# Patient Record
Sex: FEMALE | Race: WHITE | NOT HISPANIC OR LATINO | URBAN - METROPOLITAN AREA
[De-identification: names, ages, dates, MRNs, and addresses within clinical notes are randomized per-mention and may not be internally consistent; named-entity substitution may affect disease eponyms.]

---
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
## 2021-07-17 ENCOUNTER — EMERGENCY (EMERGENCY)
Facility: HOSPITAL | Age: 21
LOS: 0 days | Discharge: HOME | End: 2021-07-17
Attending: EMERGENCY MEDICINE
Payer: COMMERCIAL

## 2021-07-17 VITALS — WEIGHT: 139.99 LBS | HEIGHT: 67 IN

## 2021-07-17 VITALS
OXYGEN SATURATION: 100 % | DIASTOLIC BLOOD PRESSURE: 56 MMHG | HEART RATE: 78 BPM | TEMPERATURE: 98 F | SYSTOLIC BLOOD PRESSURE: 115 MMHG | RESPIRATION RATE: 19 BRPM

## 2021-07-17 DIAGNOSIS — R10.31 RIGHT LOWER QUADRANT PAIN: ICD-10-CM

## 2021-07-17 DIAGNOSIS — R19.7 DIARRHEA, UNSPECIFIED: ICD-10-CM

## 2021-07-17 DIAGNOSIS — R11.2 NAUSEA WITH VOMITING, UNSPECIFIED: ICD-10-CM

## 2021-07-17 LAB
ALBUMIN SERPL ELPH-MCNC: 4.7 G/DL — SIGNIFICANT CHANGE UP (ref 3.3–5)
ALP SERPL-CCNC: 63 U/L — SIGNIFICANT CHANGE UP (ref 40–120)
ALT FLD-CCNC: 25 U/L — SIGNIFICANT CHANGE UP (ref 12–78)
ANION GAP SERPL CALC-SCNC: 8 MMOL/L — SIGNIFICANT CHANGE UP (ref 5–17)
APPEARANCE UR: CLEAR — SIGNIFICANT CHANGE UP
AST SERPL-CCNC: 23 U/L — SIGNIFICANT CHANGE UP (ref 15–37)
BASOPHILS # BLD AUTO: 0.04 K/UL — SIGNIFICANT CHANGE UP (ref 0–0.2)
BASOPHILS NFR BLD AUTO: 0.3 % — SIGNIFICANT CHANGE UP (ref 0–2)
BILIRUB SERPL-MCNC: 0.9 MG/DL — SIGNIFICANT CHANGE UP (ref 0.2–1.2)
BILIRUB UR-MCNC: NEGATIVE — SIGNIFICANT CHANGE UP
BUN SERPL-MCNC: 16 MG/DL — SIGNIFICANT CHANGE UP (ref 7–23)
CALCIUM SERPL-MCNC: 9.8 MG/DL — SIGNIFICANT CHANGE UP (ref 8.5–10.1)
CHLORIDE SERPL-SCNC: 103 MMOL/L — SIGNIFICANT CHANGE UP (ref 96–108)
CO2 SERPL-SCNC: 25 MMOL/L — SIGNIFICANT CHANGE UP (ref 22–31)
COLOR SPEC: YELLOW — SIGNIFICANT CHANGE UP
CREAT SERPL-MCNC: 0.73 MG/DL — SIGNIFICANT CHANGE UP (ref 0.5–1.3)
DIFF PNL FLD: NEGATIVE — SIGNIFICANT CHANGE UP
EOSINOPHIL # BLD AUTO: 0 K/UL — SIGNIFICANT CHANGE UP (ref 0–0.5)
EOSINOPHIL NFR BLD AUTO: 0 % — SIGNIFICANT CHANGE UP (ref 0–6)
GLUCOSE SERPL-MCNC: 74 MG/DL — SIGNIFICANT CHANGE UP (ref 70–99)
GLUCOSE UR QL: NEGATIVE MG/DL — SIGNIFICANT CHANGE UP
HCT VFR BLD CALC: 40 % — SIGNIFICANT CHANGE UP (ref 34.5–45)
HGB BLD-MCNC: 13.1 G/DL — SIGNIFICANT CHANGE UP (ref 11.5–15.5)
IMM GRANULOCYTES NFR BLD AUTO: 0.3 % — SIGNIFICANT CHANGE UP (ref 0–1.5)
KETONES UR-MCNC: ABNORMAL
LEUKOCYTE ESTERASE UR-ACNC: NEGATIVE — SIGNIFICANT CHANGE UP
LIDOCAIN IGE QN: 58 U/L — LOW (ref 73–393)
LYMPHOCYTES # BLD AUTO: 1.99 K/UL — SIGNIFICANT CHANGE UP (ref 1–3.3)
LYMPHOCYTES # BLD AUTO: 12.7 % — LOW (ref 13–44)
MCHC RBC-ENTMCNC: 30.3 PG — SIGNIFICANT CHANGE UP (ref 27–34)
MCHC RBC-ENTMCNC: 32.8 GM/DL — SIGNIFICANT CHANGE UP (ref 32–36)
MCV RBC AUTO: 92.4 FL — SIGNIFICANT CHANGE UP (ref 80–100)
MONOCYTES # BLD AUTO: 0.45 K/UL — SIGNIFICANT CHANGE UP (ref 0–0.9)
MONOCYTES NFR BLD AUTO: 2.9 % — SIGNIFICANT CHANGE UP (ref 2–14)
NEUTROPHILS # BLD AUTO: 13.19 K/UL — HIGH (ref 1.8–7.4)
NEUTROPHILS NFR BLD AUTO: 83.8 % — HIGH (ref 43–77)
NITRITE UR-MCNC: NEGATIVE — SIGNIFICANT CHANGE UP
PH UR: 6 — SIGNIFICANT CHANGE UP (ref 5–8)
PLATELET # BLD AUTO: 267 K/UL — SIGNIFICANT CHANGE UP (ref 150–400)
POTASSIUM SERPL-MCNC: 4.4 MMOL/L — SIGNIFICANT CHANGE UP (ref 3.5–5.3)
POTASSIUM SERPL-SCNC: 4.4 MMOL/L — SIGNIFICANT CHANGE UP (ref 3.5–5.3)
PROT SERPL-MCNC: 7.8 GM/DL — SIGNIFICANT CHANGE UP (ref 6–8.3)
PROT UR-MCNC: 15 MG/DL
RBC # BLD: 4.33 M/UL — SIGNIFICANT CHANGE UP (ref 3.8–5.2)
RBC # FLD: 11.9 % — SIGNIFICANT CHANGE UP (ref 10.3–14.5)
SODIUM SERPL-SCNC: 136 MMOL/L — SIGNIFICANT CHANGE UP (ref 135–145)
SP GR SPEC: 1.02 — SIGNIFICANT CHANGE UP (ref 1.01–1.02)
UROBILINOGEN FLD QL: NEGATIVE MG/DL — SIGNIFICANT CHANGE UP
WBC # BLD: 15.72 K/UL — HIGH (ref 3.8–10.5)
WBC # FLD AUTO: 15.72 K/UL — HIGH (ref 3.8–10.5)

## 2021-07-17 PROCEDURE — 76856 US EXAM PELVIC COMPLETE: CPT | Mod: 26

## 2021-07-17 PROCEDURE — 83690 ASSAY OF LIPASE: CPT

## 2021-07-17 PROCEDURE — 76705 ECHO EXAM OF ABDOMEN: CPT

## 2021-07-17 PROCEDURE — 76856 US EXAM PELVIC COMPLETE: CPT

## 2021-07-17 PROCEDURE — 81001 URINALYSIS AUTO W/SCOPE: CPT

## 2021-07-17 PROCEDURE — 85025 COMPLETE CBC W/AUTO DIFF WBC: CPT

## 2021-07-17 PROCEDURE — 76705 ECHO EXAM OF ABDOMEN: CPT | Mod: 26

## 2021-07-17 PROCEDURE — 36415 COLL VENOUS BLD VENIPUNCTURE: CPT

## 2021-07-17 PROCEDURE — 80053 COMPREHEN METABOLIC PANEL: CPT

## 2021-07-17 PROCEDURE — 99285 EMERGENCY DEPT VISIT HI MDM: CPT

## 2021-07-17 PROCEDURE — 87086 URINE CULTURE/COLONY COUNT: CPT

## 2021-07-17 PROCEDURE — 99284 EMERGENCY DEPT VISIT MOD MDM: CPT | Mod: 25

## 2021-07-17 RX ORDER — ONDANSETRON 8 MG/1
1 TABLET, FILM COATED ORAL
Qty: 20 | Refills: 0
Start: 2021-07-17

## 2021-07-17 RX ORDER — ACETAMINOPHEN 500 MG
1000 TABLET ORAL ONCE
Refills: 0 | Status: COMPLETED | OUTPATIENT
Start: 2021-07-17 | End: 2021-07-17

## 2021-07-17 RX ORDER — ONDANSETRON 8 MG/1
4 TABLET, FILM COATED ORAL ONCE
Refills: 0 | Status: COMPLETED | OUTPATIENT
Start: 2021-07-17 | End: 2021-07-17

## 2021-07-17 RX ORDER — SODIUM CHLORIDE 9 MG/ML
1000 INJECTION INTRAMUSCULAR; INTRAVENOUS; SUBCUTANEOUS ONCE
Refills: 0 | Status: COMPLETED | OUTPATIENT
Start: 2021-07-17 | End: 2021-07-17

## 2021-07-17 RX ADMIN — Medication 1000 MILLIGRAM(S): at 15:24

## 2021-07-17 RX ADMIN — ONDANSETRON 4 MILLIGRAM(S): 8 TABLET, FILM COATED ORAL at 14:07

## 2021-07-17 RX ADMIN — SODIUM CHLORIDE 1000 MILLILITER(S): 9 INJECTION INTRAMUSCULAR; INTRAVENOUS; SUBCUTANEOUS at 14:06

## 2021-07-17 NOTE — ED STATDOCS - NSFOLLOWUPINSTRUCTIONS_ED_ALL_ED_FT

## 2021-07-17 NOTE — ED STATDOCS - SCRIBE NAME
Amarilis Kaiser
I personally performed the service described in the documentation recorded by the scribe in my presence, and it accurately and completely records my words and actions.

## 2021-07-17 NOTE — ED STATDOCS - CONSTITUTIONAL, MLM
well appearing and in no apparent distress. normal... well appearing and in no apparent distress. nontoxic appearing. NAD.

## 2021-07-17 NOTE — ED STATDOCS - CARE PLAN
Principal Discharge DX:	Non-intractable vomiting with nausea  Secondary Diagnosis:	Right lower quadrant abdominal pain

## 2021-07-17 NOTE — ED ADULT NURSE NOTE - OBJECTIVE STATEMENT
Pt arrives to ED accompanied by parents complaining of abdominal pain with nausea and vomiting. denies medical history. alert and oriented x4. ambulatory.

## 2021-07-17 NOTE — ED STATDOCS - PATIENT PORTAL LINK FT
You can access the FollowMyHealth Patient Portal offered by [facility] by registering at the following website: [email or web address]. By joining [employer]’s FollowMyHealth portal, you will also be able to view your health information using other applications (apps) compatible with our system.

## 2021-07-17 NOTE — ED STATDOCS - PROGRESS NOTE DETAILS
19 y/o F with PMH of breast reduction apx 1 year ago presents with sudden onset nausea/vomiting/diarrhea which started at 6:30AM today. Mother at bedside reports patient has been involved in intense tennis training this week, being outside in the heat for prolonged amounts of time. Also had seafood last night for dinner. Denies fever, chills, abdominal pain, urinary complaints, vaginal bleeding. Reports stool as brown in color. PE: Well appearing. Cardiac: s1s2, RRR. Lungs: CTAB. Abdomen: NBS x4, soft, nontender. No CVAT. A/P: N/V/D. Will provide IVF, antiemetics, check labs. Abdomen nontender, will hold imaging at this time. - Corbin Canela PA-C Jaime ALTAMIARNO for Dr. Díaz: updated pt with results of labs and imaging +leukocytosis 15K. Will send for US r/o appendicitis. Will also evaluate for ovarian cyst with US pelvis. Reassess. - Corbin Canela PA-C Arjun HOLT: Spoke with patient and updated her on the results of the available labs and imaging. Spoke with patient's father and mother with patient's consent and permission. Patient with slight leukocytosis. Spoke with patient's physician Dr. Triplett, on the phone via dad who agrees that patient is borderline as far as risk for appendicitis. Patient is currently tender on palpation of RLQ but has no rebound tenderness. Spoke with surgical attending Dr. Vazquez who will contact Dr. Mandeep Lal to come and see the patient. Dr. Triplett also on his way to see patient. Offered CT vs. serial exam and outpatient follow up for patient. Patient currently awaiting re-evaluation. 21 y/o F with PMH of breast reduction apx 1 year ago presents with sudden onset nausea/vomiting/diarrhea which started at 6:30AM today. Mother at bedside reports patient has been involved in intense tennis training this week, being outside in the heat for prolonged amounts of time. Also had seafood last night for dinner. Denies fever, chills, urinary complaints, vaginal bleeding. Reports stool as brown in color. PE: Well appearing. Cardiac: s1s2, RRR. Lungs: CTAB. Abdomen: NBS x4, soft, nontender. No CVAT. A/P: N/V/D. Will provide IVF, antiemetics, check labs. Abdomen nontender, will hold imaging at this time. - Corbin Canela PA-C Shared decision making between patient, family, Dr. Nance and Dr. Lal. Will hold further imaging at this time. Will provide zofran for home use. Return precautions provided. UA pending, will call with results upon completion. - Corbin Canela PA-C Arjun HOLT: Spoke with patient and updated her on the results of the available labs and imaging. Spoke with patient's father and mother with patient's consent and permission. Patient with slight leukocytosis. Spoke with patient's physician Dr. Triplett, on the phone via dad who agrees that patient is borderline as far as risk for appendicitis. Patient is currently tender on palpation of RLQ but has no rebound tenderness. Spoke with surgical attending Dr. Vazquez who will contact Dr. Mandeep Lal to come and see the patient. Dr. washington also on his way to see patient. Offered CT vs. serial exam and outpatient follow up for patient. Patient currently awaiting re-evaluation. Jaime Díaz: updated pt's mom and dad (with patient's permission and consent) with results of labs and imaging available.

## 2021-07-17 NOTE — ED STATDOCS - ATTENDING CONTRIBUTION TO CARE
I Ramakrishna Díaz MD saw and examined the patient. MLP saw and examined the patient under my supervision. I discussed the care of the patient with MLP and agree with MLP's plan, assessment and care of the patient while in the ED.

## 2021-07-17 NOTE — ED STATDOCS - OBJECTIVE STATEMENT
21 y/o female with a PMHx of Vasovagal syncope, h/o breast reduction 1 year ago by Dr. Marie in [facility], presents to the ED c/o lower abdominal pain (mostly in the R), nausea, vomiting since 0600 today. States vomiting resolved at 11am. States nausea and abd pain continued and had diarrhea. +Decrease po intake. No blood in stool, fever, chills, CP, SOB. Mother states pt has been training tennis intensely this week in the heat for a prolonged period of time. 21 y/o female with a PMHx of Vasovagal syncope, h/o breast reduction 1 year ago by Dr. Marie in [facility], presents to the ED c/o lower abdominal pain (mostly in the R), nausea, vomiting since 0600 today. States vomiting resolved at 11am. States nausea and abd pain continued and had diarrhea. +Decrease po intake. No blood in stool, fever, chills, CP, SOB. Mother states pt has been training tennis intensely this week in the heat for a prolonged period of time. no neck pain or stiffness. No other complaints.

## 2021-07-17 NOTE — ED STATDOCS - NS_ ATTENDINGSCRIBEDETAILS _ED_A_ED_FT
I Ramakrishna Díaz MD saw and examined the patient. Scribe documented for me and under my supervision. I have modified the scribe's documentation where necessary to reflect my history, physical exam and other relevant documentations pertinent to the care of the patient.

## 2021-07-17 NOTE — ED STATDOCS - NEUROLOGICAL, MLM
sensation is normal and strength is normal. NIH 0. GCS 15 sensation is normal and strength is normal. Peds GCS 15

## 2021-07-17 NOTE — ED STATDOCS - CROS ED ROS STATEMENT
See the other encounter.    Clem, RN  Triage Nurse           all other ROS negative except as per HPI

## 2021-07-17 NOTE — ED STATDOCS - EYES, MLM
clear bilaterally.  Pupils equal, round, and reactive to light. clear bilaterally.  Pupils equal, round, and reactive to accomodation. EOMI.

## 2021-07-18 LAB
CULTURE RESULTS: SIGNIFICANT CHANGE UP
SPECIMEN SOURCE: SIGNIFICANT CHANGE UP

## 2022-08-15 ENCOUNTER — INPATIENT (INPATIENT)
Facility: HOSPITAL | Age: 22
LOS: 0 days | Discharge: HOME | DRG: 343 | End: 2022-08-16
Attending: SURGERY | Admitting: SURGERY
Payer: COMMERCIAL

## 2022-08-15 VITALS — WEIGHT: 139.99 LBS | HEIGHT: 67 IN

## 2022-08-15 LAB
APPEARANCE UR: CLEAR — SIGNIFICANT CHANGE UP
BILIRUB UR-MCNC: NEGATIVE — SIGNIFICANT CHANGE UP
COLOR SPEC: YELLOW — SIGNIFICANT CHANGE UP
DIFF PNL FLD: ABNORMAL
GLUCOSE UR QL: NEGATIVE — SIGNIFICANT CHANGE UP
HCT VFR BLD CALC: 37.3 % — SIGNIFICANT CHANGE UP (ref 34.5–45)
HGB BLD-MCNC: 12.7 G/DL — SIGNIFICANT CHANGE UP (ref 11.5–15.5)
KETONES UR-MCNC: NEGATIVE — SIGNIFICANT CHANGE UP
LEUKOCYTE ESTERASE UR-ACNC: NEGATIVE — SIGNIFICANT CHANGE UP
MCHC RBC-ENTMCNC: 31.2 PG — SIGNIFICANT CHANGE UP (ref 27–34)
MCHC RBC-ENTMCNC: 34 GM/DL — SIGNIFICANT CHANGE UP (ref 32–36)
MCV RBC AUTO: 91.6 FL — SIGNIFICANT CHANGE UP (ref 80–100)
NITRITE UR-MCNC: NEGATIVE — SIGNIFICANT CHANGE UP
PH UR: 6 — SIGNIFICANT CHANGE UP (ref 5–8)
PLATELET # BLD AUTO: 215 K/UL — SIGNIFICANT CHANGE UP (ref 150–400)
PROT UR-MCNC: NEGATIVE — SIGNIFICANT CHANGE UP
RBC # BLD: 4.07 M/UL — SIGNIFICANT CHANGE UP (ref 3.8–5.2)
RBC # FLD: 11.9 % — SIGNIFICANT CHANGE UP (ref 10.3–14.5)
SP GR SPEC: 1 — LOW (ref 1.01–1.02)
UROBILINOGEN FLD QL: NEGATIVE — SIGNIFICANT CHANGE UP
WBC # BLD: 6.76 K/UL — SIGNIFICANT CHANGE UP (ref 3.8–10.5)
WBC # FLD AUTO: 6.76 K/UL — SIGNIFICANT CHANGE UP (ref 3.8–10.5)

## 2022-08-15 PROCEDURE — 76856 US EXAM PELVIC COMPLETE: CPT | Mod: 26,59

## 2022-08-15 PROCEDURE — 76830 TRANSVAGINAL US NON-OB: CPT | Mod: 26

## 2022-08-15 PROCEDURE — 76705 ECHO EXAM OF ABDOMEN: CPT | Mod: 26

## 2022-08-15 PROCEDURE — C9399: CPT

## 2022-08-15 PROCEDURE — 99285 EMERGENCY DEPT VISIT HI MDM: CPT

## 2022-08-15 PROCEDURE — 88304 TISSUE EXAM BY PATHOLOGIST: CPT

## 2022-08-15 PROCEDURE — C1889: CPT

## 2022-08-15 RX ORDER — MINERAL OIL
133 OIL (ML) MISCELLANEOUS ONCE
Refills: 0 | Status: COMPLETED | OUTPATIENT
Start: 2022-08-15 | End: 2022-08-15

## 2022-08-15 RX ORDER — DIAZEPAM 5 MG
5 TABLET ORAL ONCE
Refills: 0 | Status: DISCONTINUED | OUTPATIENT
Start: 2022-08-15 | End: 2022-08-15

## 2022-08-15 RX ORDER — LIDOCAINE AND PRILOCAINE CREAM 25; 25 MG/G; MG/G
1 CREAM TOPICAL ONCE
Refills: 0 | Status: COMPLETED | OUTPATIENT
Start: 2022-08-15 | End: 2022-08-15

## 2022-08-15 RX ORDER — ACETAMINOPHEN 500 MG
650 TABLET ORAL ONCE
Refills: 0 | Status: COMPLETED | OUTPATIENT
Start: 2022-08-15 | End: 2022-08-15

## 2022-08-15 RX ADMIN — LIDOCAINE AND PRILOCAINE CREAM 1 APPLICATION(S): 25; 25 CREAM TOPICAL at 22:15

## 2022-08-15 RX ADMIN — Medication 5 MILLIGRAM(S): at 22:13

## 2022-08-15 RX ADMIN — Medication 133 MILLILITER(S): at 21:08

## 2022-08-15 NOTE — ED STATDOCS - CLINICAL SUMMARY MEDICAL DECISION MAKING FREE TEXT BOX
Pt with constipation and RLQ abd pain. Low suspicion of appendicitis however will sono to r/o appendicitis. Will sono ovaries to r/o ovarian pathology, rectal exam as pt was sent in for possible disimpaction. 66.8

## 2022-08-15 NOTE — ED STATDOCS - OBJECTIVE STATEMENT
20 y/o female with a PMHx of constipation presents to the ED c/o constipation. Pt took Miralax, Dulcolax and Ex lax at home without relief. Mother reports pt has hx of anal fissure. +nausea. Denies vomiting, fevers. No other complaints at this time.

## 2022-08-15 NOTE — ED STATDOCS - PROGRESS NOTE DETAILS
Des Murphy MD wanted labs for concern for appendicitis however pt states she thinks this is constipation and does not want labs at this time, understands the risk of not checking labs. also does not want CT for r/o appy, john diaz. received so from Dr. Das, labs and ct r/o appendicitis pending KRISTI Canas DO pt sleeping, updated family at bedside, awaiting ct results KRISTI Canas DO spoke with parents at bedside, called on call Dr. Lal, will give zosyn pre op. pt sleeping at this time KRISTI Canas DO

## 2022-08-15 NOTE — ED ADULT NURSE NOTE - OBJECTIVE STATEMENT
21 year old female found laying on stretcher complaining of abdominal pain with constipation x1 week.  pt has taken several OTC medications for constipation and they only caused severe cramping.

## 2022-08-15 NOTE — ED ADULT TRIAGE NOTE - CHIEF COMPLAINT QUOTE
Pt c/o constipation x1 week. Has taken Mirlax, Exlax, and Ducolax with no relief. Only passing gas at night. Last BM on 8/8. Denies abd. pain, N/V, fever, and urinary symptoms.

## 2022-08-16 VITALS
HEART RATE: 52 BPM | DIASTOLIC BLOOD PRESSURE: 76 MMHG | TEMPERATURE: 98 F | RESPIRATION RATE: 16 BRPM | OXYGEN SATURATION: 100 % | SYSTOLIC BLOOD PRESSURE: 112 MMHG

## 2022-08-16 DIAGNOSIS — K35.80 UNSPECIFIED ACUTE APPENDICITIS: ICD-10-CM

## 2022-08-16 DIAGNOSIS — Z98.890 OTHER SPECIFIED POSTPROCEDURAL STATES: Chronic | ICD-10-CM

## 2022-08-16 LAB
ALBUMIN SERPL ELPH-MCNC: 4.1 G/DL — SIGNIFICANT CHANGE UP (ref 3.3–5)
ALP SERPL-CCNC: 46 U/L — SIGNIFICANT CHANGE UP (ref 40–120)
ALT FLD-CCNC: 29 U/L — SIGNIFICANT CHANGE UP (ref 12–78)
ANION GAP SERPL CALC-SCNC: 5 MMOL/L — SIGNIFICANT CHANGE UP (ref 5–17)
APTT BLD: 34.8 SEC — SIGNIFICANT CHANGE UP (ref 27.5–35.5)
AST SERPL-CCNC: 27 U/L — SIGNIFICANT CHANGE UP (ref 15–37)
BILIRUB SERPL-MCNC: 0.7 MG/DL — SIGNIFICANT CHANGE UP (ref 0.2–1.2)
BUN SERPL-MCNC: 7 MG/DL — SIGNIFICANT CHANGE UP (ref 7–23)
CALCIUM SERPL-MCNC: 9.2 MG/DL — SIGNIFICANT CHANGE UP (ref 8.5–10.1)
CHLORIDE SERPL-SCNC: 105 MMOL/L — SIGNIFICANT CHANGE UP (ref 96–108)
CO2 SERPL-SCNC: 25 MMOL/L — SIGNIFICANT CHANGE UP (ref 22–31)
CREAT SERPL-MCNC: 0.72 MG/DL — SIGNIFICANT CHANGE UP (ref 0.5–1.3)
CULTURE RESULTS: SIGNIFICANT CHANGE UP
EGFR: 123 ML/MIN/1.73M2 — SIGNIFICANT CHANGE UP
GLUCOSE SERPL-MCNC: 75 MG/DL — SIGNIFICANT CHANGE UP (ref 70–99)
HCG SERPL-ACNC: <1 MIU/ML — SIGNIFICANT CHANGE UP
INR BLD: 1.08 RATIO — SIGNIFICANT CHANGE UP (ref 0.88–1.16)
POTASSIUM SERPL-MCNC: 3.6 MMOL/L — SIGNIFICANT CHANGE UP (ref 3.5–5.3)
POTASSIUM SERPL-SCNC: 3.6 MMOL/L — SIGNIFICANT CHANGE UP (ref 3.5–5.3)
PROT SERPL-MCNC: 7.6 GM/DL — SIGNIFICANT CHANGE UP (ref 6–8.3)
PROTHROM AB SERPL-ACNC: 12.5 SEC — SIGNIFICANT CHANGE UP (ref 10.5–13.4)
SARS-COV-2 RNA SPEC QL NAA+PROBE: SIGNIFICANT CHANGE UP
SODIUM SERPL-SCNC: 135 MMOL/L — SIGNIFICANT CHANGE UP (ref 135–145)
SPECIMEN SOURCE: SIGNIFICANT CHANGE UP

## 2022-08-16 PROCEDURE — 88304 TISSUE EXAM BY PATHOLOGIST: CPT | Mod: 26

## 2022-08-16 PROCEDURE — 74177 CT ABD & PELVIS W/CONTRAST: CPT | Mod: 26,MD

## 2022-08-16 PROCEDURE — 99222 1ST HOSP IP/OBS MODERATE 55: CPT

## 2022-08-16 PROCEDURE — ZZZZZ: CPT

## 2022-08-16 RX ORDER — ALPRAZOLAM 0.25 MG
1 TABLET ORAL
Qty: 0 | Refills: 0 | DISCHARGE

## 2022-08-16 RX ORDER — SODIUM CHLORIDE 9 MG/ML
1000 INJECTION INTRAMUSCULAR; INTRAVENOUS; SUBCUTANEOUS
Refills: 0 | Status: DISCONTINUED | OUTPATIENT
Start: 2022-08-16 | End: 2022-08-16

## 2022-08-16 RX ORDER — DEXTROAMPHETAMINE SACCHARATE, AMPHETAMINE ASPARTATE, DEXTROAMPHETAMINE SULFATE AND AMPHETAMINE SULFATE 1.875; 1.875; 1.875; 1.875 MG/1; MG/1; MG/1; MG/1
1 TABLET ORAL
Qty: 0 | Refills: 0 | DISCHARGE

## 2022-08-16 RX ORDER — FENTANYL CITRATE 50 UG/ML
25 INJECTION INTRAVENOUS
Refills: 0 | Status: DISCONTINUED | OUTPATIENT
Start: 2022-08-16 | End: 2022-08-16

## 2022-08-16 RX ORDER — HYDROMORPHONE HYDROCHLORIDE 2 MG/ML
0.5 INJECTION INTRAMUSCULAR; INTRAVENOUS; SUBCUTANEOUS
Refills: 0 | Status: DISCONTINUED | OUTPATIENT
Start: 2022-08-16 | End: 2022-08-16

## 2022-08-16 RX ORDER — SERTRALINE 25 MG/1
0 TABLET, FILM COATED ORAL
Qty: 0 | Refills: 0 | DISCHARGE

## 2022-08-16 RX ORDER — ONDANSETRON 8 MG/1
4 TABLET, FILM COATED ORAL EVERY 6 HOURS
Refills: 0 | Status: DISCONTINUED | OUTPATIENT
Start: 2022-08-16 | End: 2022-08-16

## 2022-08-16 RX ORDER — PIPERACILLIN AND TAZOBACTAM 4; .5 G/20ML; G/20ML
3.38 INJECTION, POWDER, LYOPHILIZED, FOR SOLUTION INTRAVENOUS ONCE
Refills: 0 | Status: COMPLETED | OUTPATIENT
Start: 2022-08-16 | End: 2022-08-16

## 2022-08-16 RX ORDER — ONDANSETRON 8 MG/1
4 TABLET, FILM COATED ORAL ONCE
Refills: 0 | Status: DISCONTINUED | OUTPATIENT
Start: 2022-08-16 | End: 2022-08-16

## 2022-08-16 RX ORDER — SODIUM CHLORIDE 9 MG/ML
1000 INJECTION, SOLUTION INTRAVENOUS
Refills: 0 | Status: DISCONTINUED | OUTPATIENT
Start: 2022-08-16 | End: 2022-08-16

## 2022-08-16 RX ADMIN — SODIUM CHLORIDE 125 MILLILITER(S): 9 INJECTION INTRAMUSCULAR; INTRAVENOUS; SUBCUTANEOUS at 04:58

## 2022-08-16 RX ADMIN — Medication 1 MILLIGRAM(S): at 00:02

## 2022-08-16 RX ADMIN — PIPERACILLIN AND TAZOBACTAM 200 GRAM(S): 4; .5 INJECTION, POWDER, LYOPHILIZED, FOR SOLUTION INTRAVENOUS at 03:50

## 2022-08-16 NOTE — ASU DISCHARGE PLAN (ADULT/PEDIATRIC) - CALL YOUR DOCTOR IF YOU HAVE ANY OF THE FOLLOWING:
Pain not relieved by Medications/Fever greater than (need to indicate Fahrenheit or Celsius)/Unable to urinate/Inability to tolerate liquids or foods

## 2022-08-16 NOTE — H&P ADULT - ASSESSMENT
Ass:  Acute Appendicitis  Plan:  As discussed with Dr. Lal, NPO, IVF, Covid swab pending, Emergent Laparoscopic Appendectomy

## 2022-08-16 NOTE — ASU DISCHARGE PLAN (ADULT/PEDIATRIC) - NS MD DC FALL RISK RISK
For information on Fall & Injury Prevention, visit: [email or web address] [place]  [email or web address] [place]  https://www.cdc.gov/steadi/patient.html

## 2022-08-16 NOTE — ED ADULT NURSE REASSESSMENT NOTE - NS ED NURSE REASSESS COMMENT FT1
Pt found brought in from [facility], pt found resting peacefully in stretcher in lowest position, VSS, pt parents at bedside. Pt pending sx. Medications administered as ordered. COVID swab sent to lab, requested RAPID test for COVID.

## 2022-08-16 NOTE — H&P ADULT - NSHPPHYSICALEXAM_GEN_ALL_CORE
T(C): 36.7 (08-16-22 @ 00:10), Max: 36.7 (08-15-22 @ 18:12)  HR: 55 (08-16-22 @ 00:10) (55 - 56)  BP: 116/85 (08-16-22 @ 00:10) (106/86 - 116/85)  RR: 18 (08-16-22 @ 00:10) (18 - 18)  SpO2: 100% (08-16-22 @ 00:10) (100% - 100%)  Wt(kg): --    Gen:  Pt is medicated but easily arousable    Skin:  warm and dry    HEENT:  NC/AT, PERRL, no otorrhea, nares patent, buccal mucosa pink and moist    Neck:  trachea midline, no JVD    Respiratory: Lungs clear and equal bilat, no r/r/w    Cardiovascular:  S1S2 reg, no M    Gastrointestinal:  non-distended, +BS, soft, +BLQ tenderness, R>L, no guarding, no rebound    Extremities:  no c/c/e, no calf tenderness    Neurological:  A & O X3, CNII-XII grossly intact

## 2022-08-16 NOTE — H&P ADULT - HISTORY OF PRESENT ILLNESS
The pt is a 20 y/o female with a PMHx of constipation presents to the ED c/o constipation. Pt took Miralax, Dulcolax and Ex lax at home without relief. Mother reports pt has hx of anal fissure. +nausea. Denies vomiting, fevers. No other complaints at this time.    Patient is a 21y old  Female who presents with a chief complaint of     HPI:      PAST MEDICAL & SURGICAL HISTORY:  Vasovagal syncope      No significant past surgical history          MEDICATIONS  (STANDING):

## 2022-08-16 NOTE — ASU DISCHARGE PLAN (ADULT/PEDIATRIC) - ASU DC SPECIAL INSTRUCTIONSFT
Advance activity as tolerated.  Continue all previously prescribed medications as directed unless otherwise instructed.  Follow up with your primary care physician in 48-72 hours- bring copies of your results.  Return to the ER for worsening or persistent symptoms, and/or ANY NEW OR CONCERNING SYMPTOMS. If you have issues obtaining follow up, please call: [phone] to obtain a doctor or specialist who takes your insurance in your area.  You may call [phone] to make an appointment with the internal medicine clinic.

## 2022-08-16 NOTE — CHART NOTE - NSCHARTNOTEFT_GEN_A_CORE
DATE OF OPERATION: 8-16-22    SURGEON: Mandeep Lal MD    ASSISTANT: Stacey Steiner    PREOPERATIVE DIAGNOSIS: Acute appendicitis    POSTOPERATIVE DIAGNOSIS: Acute appendicitis    PROCEDURES: Laparoscopic Appendectomy, TAP Block    ANESTHESIA: General endotracheal anesthesia.    ESTIMATED BLOOD LOSS: Approximately 30 mL.    DRAINS: None.    SPECIMENS: Appendix and pathology    COMPLICATIONS: None.    INDICATION FOR THE PROCEDURE: The patient is a 22 y/o female with a PMHx of constipation presents to the ED with abdominal pain for the last 7 days. The pain became worse over the last day and now the pain is localized in the right lower quadrant.  The patient had a CT scan of the abdomen which showed acute appendicitis.  The patient was indicated for laparoscopic appendectomy.    DESCRIPTION OF THE PROCEDURE:  The patient was identified properly via timeout.  The patient was brought into the operating room and was placed onto the operating room table in supine position.  The patient was then given general endotracheal anesthesia and was given preoperative antibiotics.  The patient was then prepped and draped in the usual sterile fashion.  An Exparel mixture was mixed at the back table with 20 mL of Exparel, 30 mL of 0.25% Marcaine and 150 mL of normal saline.  A Veress needle was placed in left upper quadrant.  The abdomen was insufflated to 15 mmHg.  Once the abdomen was insufflated, the Exparel mixture was given subcutaneously at the sites of incision.  An incision was made within the umbilicus and a 12 mm trocar was placed in the abdomen.  The laparoscope was inserted and there was no injury on initial trocar placement or initial Veress needle placement.  A transversus abdominus plane Block was then conducted by placing 20 mL of the Exparel mixture preperitoneal at the distribution of the spinal nerves on the lateral abdominal wall.  This was started at the costal margin at the mid axillary line.  20 mL of the Exparel mixture was placed in this area.  Another 20 mL was placed 4 cm caudal to this area.  Another 20 cm was placed 4 cm caudal to this area and another 15 mL was placed 4 cm caudal to this area.  This was repeated on the opposite side of the body in a similar fashion.  The rest of the Exparel was placed into the subcutaneous tissues and preperitoneal space at every trocar site. A 5 mm trocar was then placed in the left lower quadrant and a 5 mm trocar was placed suprapubic.  The abdomen was explored.  The appendix was identified by following the haustra of the cecum.  The appendix appeared very inflamed.  The tip of the appendix was then grasped and the mesoappendix was taken with the LigaSure device.  The base of the appendix was then free and the base was transected with an Endo BETH with a 45 mm tan load.  The appendix was freed and placed into an Endo catch bag and removed from the 12 mm trocar.  The abdomen was irrigated and suctioned.  Hemostasis Was achieved.  The fascial defect from the 12 mm trocar was closed with a 0 Vicryl suture.  The skin was closed with 4-0 Monocryl.  Dermabond was placed over the incisions.  The patient tolerated the procedure well and was brought to the recovery room in stable condition.

## 2022-08-16 NOTE — ASU DISCHARGE PLAN (ADULT/PEDIATRIC) - CARE PROVIDER_API CALL
Mandeep Lal)  Surgery  [address], [address]  [place] [address]  Phone: [phone]  Fax: [phone]  Follow Up Time: 1 week   Mandeep Lal)  Surgery  [address], [address]  [place] [address]  Phone: [phone]  Fax: [phone]  Follow Up Time: 2 weeks

## 2022-08-16 NOTE — ASU DISCHARGE PLAN (ADULT/PEDIATRIC) - PROVIDER TOKENS
PROVIDER:[TOKEN:[[id number]],FOLLOWUP:[1 week]] PROVIDER:[TOKEN:[[id number]],FOLLOWUP:[2 weeks]]

## 2022-08-16 NOTE — H&P ADULT - NSHPLABSRESULTS_GEN_ALL_CORE
12.7   6.76  )-----------( 215      ( 15 Aug 2022 23:26 )             37.3       08-15    135  |  105  |  7   ----------------------------<  75  3.6   |  25  |  0.72    Ca    9.2      15 Aug 2022 23:26    TPro  7.6  /  Alb  4.1  /  TBili  0.7  /  DBili  x   /  AST  27  /  ALT  29  /  AlkPhos  46  08-15    < from: CT Abdomen and Pelvis w/ Oral Cont and w/ IV Cont (08.16.22 @ 01:20) >    ACC: [id number] EXAM:  CT ABDOMEN AND PELVIS OC IC                          PROCEDURE DATE:  08/16/2022      BOWEL: No bowel obstruction. Contrast is visualized within proximal   colonic loops. Nonopacified appendix. Appendix is diffusely dilated   measuring up to 1.1 cm with trace periappendiceal stranding, concerning   for acute appendicitis.    IMPRESSION:    Acute uncomplicated appendicitis.

## 2022-08-19 DIAGNOSIS — K59.00 CONSTIPATION, UNSPECIFIED: ICD-10-CM

## 2022-08-19 DIAGNOSIS — K35.80 UNSPECIFIED ACUTE APPENDICITIS: ICD-10-CM

## 2022-08-19 DIAGNOSIS — F41.9 ANXIETY DISORDER, UNSPECIFIED: ICD-10-CM

## 2022-08-21 LAB
CULTURE RESULTS: SIGNIFICANT CHANGE UP
CULTURE RESULTS: SIGNIFICANT CHANGE UP
SPECIMEN SOURCE: SIGNIFICANT CHANGE UP
SPECIMEN SOURCE: SIGNIFICANT CHANGE UP